# Patient Record
Sex: FEMALE | Race: WHITE | URBAN - METROPOLITAN AREA
[De-identification: names, ages, dates, MRNs, and addresses within clinical notes are randomized per-mention and may not be internally consistent; named-entity substitution may affect disease eponyms.]

---

## 2023-01-03 ENCOUNTER — HOME HEALTH ADMISSION (OUTPATIENT)
Dept: HOME HEALTH SERVICES | Facility: HOME HEALTH | Age: 78
End: 2023-01-03
Payer: MEDICARE

## 2023-01-04 ENCOUNTER — HOME CARE VISIT (OUTPATIENT)
Dept: SCHEDULING | Facility: HOME HEALTH | Age: 78
End: 2023-01-04

## 2023-01-04 VITALS
SYSTOLIC BLOOD PRESSURE: 142 MMHG | OXYGEN SATURATION: 97 % | RESPIRATION RATE: 18 BRPM | DIASTOLIC BLOOD PRESSURE: 68 MMHG | TEMPERATURE: 97.8 F | HEART RATE: 95 BPM

## 2023-01-04 PROCEDURE — 0221000100 HH NO PAY CLAIM PROCEDURE

## 2023-01-04 PROCEDURE — G0162 HHC RN E&M PLAN SVS, 15 MIN: HCPCS

## 2023-01-04 ASSESSMENT — ENCOUNTER SYMPTOMS
DYSPNEA ACTIVITY LEVEL: AFTER AMBULATING MORE THAN 20 FT
PAIN LOCATION - PAIN QUALITY: ACHE

## 2023-01-05 NOTE — HOME HEALTH
Problem: Patient received skilled nursing care today for home care admission assessment SOC. Patient is alert and oriented x4. PMH:  left BKA, depression, anxiety,   Patient has left leg wounds. Caregiver involvement: Pt lives with her dtr Ever Mata and her , dtr can help with ADL's, food prep, transport. Intervention: Medications reconciled with pt and pt manages own meds. Wound care completed today, pt tolerated well. Home health supplies ordered this visit include: saline, xeroform, foam border,skin prep  Patient education provided this visit:   Medication management. Infection control and prevention measures, S/S of infection, left leg dressing care. Home safety, fall precautions   [facility] procedure, physician, and that necessitate calling emergency personnel   Diet/Nutrition   Pt wanted names of prosthetic companys in the area and SN gave her the following and pt stated she will follow up:  [facility] [id number] and [facility] [phone]. Patient/caregiver response: Verbalizes understanding via the teach back method. Progress toward goals: yes, see care plan documentation   Home exercise program: NA  Plan for next home care visit: SN for wound care and medication/disease management   The following discharge planning was discussed with the patient/caregiver: home care agency discharge to be completed when goals met. Patient in agreement.     COVID  This patient has answered YES/NO to the following questions:  1) have you traveled outside the country N  2) have you been exposed to or been in contact with anyone whom traveled outside the country in the past two weeks N  3) do you have a sore throat/fever/dry cough N  4)The patient has been educated on and demonstrates good understanding via the teach-back method for the following: Signs and symptoms of COVID-19 Y

## 2023-01-09 ENCOUNTER — HOME CARE VISIT (OUTPATIENT)
Dept: SCHEDULING | Facility: HOME HEALTH | Age: 78
End: 2023-01-09

## 2023-01-09 VITALS
HEART RATE: 68 BPM | DIASTOLIC BLOOD PRESSURE: 78 MMHG | SYSTOLIC BLOOD PRESSURE: 122 MMHG | RESPIRATION RATE: 16 BRPM | OXYGEN SATURATION: 97 % | TEMPERATURE: 97.4 F

## 2023-01-09 PROCEDURE — G0299 HHS/HOSPICE OF RN EA 15 MIN: HCPCS

## 2023-01-09 ASSESSMENT — ENCOUNTER SYMPTOMS: HEMOPTYSIS: 0

## 2023-01-17 ENCOUNTER — HOME CARE VISIT (OUTPATIENT)
Dept: SCHEDULING | Facility: HOME HEALTH | Age: 78
End: 2023-01-17
Payer: MEDICARE

## 2023-01-17 PROCEDURE — G0299 HHS/HOSPICE OF RN EA 15 MIN: HCPCS

## 2023-01-18 VITALS
DIASTOLIC BLOOD PRESSURE: 78 MMHG | OXYGEN SATURATION: 96 % | TEMPERATURE: 97.4 F | SYSTOLIC BLOOD PRESSURE: 138 MMHG | RESPIRATION RATE: 16 BRPM | HEART RATE: 78 BPM

## 2023-01-18 ASSESSMENT — ENCOUNTER SYMPTOMS: HEMOPTYSIS: 0

## 2023-01-18 NOTE — HOME HEALTH
SN for skilled assessments and wound care management/teaching. Wound care completed as ordered in POC, tolerated well. Wound progressing in healing. No s/s of infection. Patient denies any problems with wound care and aware of s/s of infection to report to /MD/91Laurita. VS all wnl, denies any changes in medication, shob, falls, pain, or any new open wounds at this time. No s/s of distress during SN visit. Aware and approves of next scheduled SN visit.

## 2023-01-20 ENCOUNTER — HOME CARE VISIT (OUTPATIENT)
Dept: SCHEDULING | Facility: HOME HEALTH | Age: 78
End: 2023-01-20
Payer: MEDICARE

## 2023-01-20 PROCEDURE — G0299 HHS/HOSPICE OF RN EA 15 MIN: HCPCS

## 2023-01-21 VITALS
HEART RATE: 77 BPM | OXYGEN SATURATION: 99 % | TEMPERATURE: 97.2 F | SYSTOLIC BLOOD PRESSURE: 136 MMHG | DIASTOLIC BLOOD PRESSURE: 70 MMHG | RESPIRATION RATE: 16 BRPM

## 2023-01-21 ASSESSMENT — ENCOUNTER SYMPTOMS: HEMOPTYSIS: 0

## 2023-01-24 ENCOUNTER — HOME CARE VISIT (OUTPATIENT)
Dept: SCHEDULING | Facility: HOME HEALTH | Age: 78
End: 2023-01-24
Payer: MEDICARE

## 2023-01-24 VITALS
SYSTOLIC BLOOD PRESSURE: 136 MMHG | HEART RATE: 77 BPM | TEMPERATURE: 97.4 F | OXYGEN SATURATION: 97 % | RESPIRATION RATE: 16 BRPM | DIASTOLIC BLOOD PRESSURE: 80 MMHG

## 2023-01-24 PROCEDURE — G0299 HHS/HOSPICE OF RN EA 15 MIN: HCPCS

## 2023-01-24 ASSESSMENT — ENCOUNTER SYMPTOMS
HEMOPTYSIS: 0
DYSPNEA ACTIVITY LEVEL: AFTER AMBULATING 10 - 20 FT

## 2023-01-27 ENCOUNTER — HOME CARE VISIT (OUTPATIENT)
Dept: SCHEDULING | Facility: HOME HEALTH | Age: 78
End: 2023-01-27
Payer: MEDICARE

## 2023-01-27 PROCEDURE — G0299 HHS/HOSPICE OF RN EA 15 MIN: HCPCS

## 2023-01-30 VITALS
TEMPERATURE: 97.4 F | OXYGEN SATURATION: 96 % | HEART RATE: 78 BPM | SYSTOLIC BLOOD PRESSURE: 126 MMHG | RESPIRATION RATE: 16 BRPM | DIASTOLIC BLOOD PRESSURE: 72 MMHG

## 2023-01-30 ASSESSMENT — ENCOUNTER SYMPTOMS: HEMOPTYSIS: 0

## 2023-01-31 ENCOUNTER — HOME CARE VISIT (OUTPATIENT)
Dept: SCHEDULING | Facility: HOME HEALTH | Age: 78
End: 2023-01-31
Payer: MEDICARE

## 2023-01-31 VITALS
TEMPERATURE: 97.4 F | OXYGEN SATURATION: 97 % | HEART RATE: 92 BPM | SYSTOLIC BLOOD PRESSURE: 132 MMHG | RESPIRATION RATE: 16 BRPM | DIASTOLIC BLOOD PRESSURE: 70 MMHG

## 2023-01-31 PROCEDURE — G0299 HHS/HOSPICE OF RN EA 15 MIN: HCPCS

## 2023-01-31 ASSESSMENT — ENCOUNTER SYMPTOMS: HEMOPTYSIS: 0

## 2023-02-03 ENCOUNTER — HOME CARE VISIT (OUTPATIENT)
Dept: SCHEDULING | Facility: HOME HEALTH | Age: 78
End: 2023-02-03
Payer: MEDICARE

## 2023-02-03 PROCEDURE — G0299 HHS/HOSPICE OF RN EA 15 MIN: HCPCS

## 2023-02-04 ENCOUNTER — HOME CARE VISIT (OUTPATIENT)
Dept: SCHEDULING | Facility: HOME HEALTH | Age: 78
End: 2023-02-04
Payer: MEDICARE

## 2023-02-04 PROCEDURE — G0155 HHCP-SVS OF CSW,EA 15 MIN: HCPCS

## 2023-02-04 ASSESSMENT — ENCOUNTER SYMPTOMS: HEMOPTYSIS: 0

## 2023-02-04 NOTE — HOME HEALTH
Patient had a stroke and fell and hurt leg. Patient had left leg ambutation. Patient reports wound on knee after ambutation and tried prosthetic. Prosthetic leg is being remade. Patient reports pain in knee, as well as, neuropothy. Patient lives with daughter and son-in-law. Patient seeking independent housing for seniors and has dog, needs to allow pets. Patient has limited income and is homebound as a result of amputation and being wheelchair bound after stroke and fall. Patient has approximately $1200 a month income, plus Medicare costs, life insurance, prescription insurance, medication expenses, and car insurance for 2005 car that is paid off is only worth approximately $1,000. Patient is  on paper and  is in a nursing home. Patient reports 's son put  in nursing home after  sold their home and property to son for $1.00 (one dollar). Patient reports being deceived by . Patient and  were legally  for years. Patient needs to get a . Patient reports needs Medicaid. Patient reports she is indigent. Patient reports no assets. Patient dependent on others for care physically and financially.  provided teaching regarding independent living for seniors subsidized by [employer], [employer], Special Needs Medicaid, and Long-Term Care Medicaid. Patient has some guilt due to a lack of independence now. Patient feels guilt of getting help of family members.

## 2023-02-04 NOTE — CASE COMMUNICATION
Patient had a stroke and fell and hurt leg. Patient had left leg ambutation. Patient reports wound on knee after ambutation and tried prosthetic. Prosthetic leg is being remade. Patient reports pain in knee, as well as, neuropothy. Patient lives with daughter and son-in-law. Patient seeking independent housing for seniors and has dog, needs to allow pets. Patient has limited income and is homebound as a result of amputation and  being wheelchair bound after stroke and fall. Patient has approximately $1200 a month income, plus Medicare costs, life insurance, prescription insurance, medication expenses, and car insurance for 2005 car that is paid off is only worth approximately $1,000. Patient is  on paper and  is in a nursing home. Patient reports 's son put  in nursing home after  sold their home and property to son for $2 .00 (one dollar). Patient reports being deceived by . Patient and  were legally  for years. Patient needs to get a . Patient reports needs Medicaid. Patient reports she is indigent. Patient reports no assets. Patient dependent on others for care physically and financially.  provided teaching regarding independent living for seniors subsidized by [facility], [employer], [email or web address], and Long-Term Care Medicaid. Patient has some guilt due to a lack of independence now. Patient feels guilt of getting help of family members. Patient suffering from clinically significant Adjustment Disorder.

## 2023-02-06 ASSESSMENT — ENCOUNTER SYMPTOMS: HEMOPTYSIS: 0

## 2023-02-08 ENCOUNTER — HOME CARE VISIT (OUTPATIENT)
Dept: SCHEDULING | Facility: HOME HEALTH | Age: 78
End: 2023-02-08
Payer: MEDICARE

## 2023-02-10 ENCOUNTER — HOME CARE VISIT (OUTPATIENT)
Dept: HOME HEALTH SERVICES | Facility: HOME HEALTH | Age: 78
End: 2023-02-10
Payer: MEDICARE

## 2023-02-15 ENCOUNTER — HOME CARE VISIT (OUTPATIENT)
Dept: SCHEDULING | Facility: HOME HEALTH | Age: 78
End: 2023-02-15
Payer: MEDICARE

## 2023-02-15 PROCEDURE — G0299 HHS/HOSPICE OF RN EA 15 MIN: HCPCS

## 2023-02-16 VITALS
HEART RATE: 78 BPM | DIASTOLIC BLOOD PRESSURE: 82 MMHG | OXYGEN SATURATION: 97 % | TEMPERATURE: 97.3 F | RESPIRATION RATE: 16 BRPM | SYSTOLIC BLOOD PRESSURE: 132 MMHG

## 2023-02-17 NOTE — HOME HEALTH
SN for skilled assessments and wound care management/teaching. Wound care completed as ordered in POC by patient, tolerated well. Wound progressing slowly in healing. No s/s of infection. Patient denies any problems with wound care and aware of s/s of infection to report to SN/MD/911. VS all wnl, denies any changes in medication, shob, falls, pain, or any new open wounds at this time. No s/s of distress during SN visit. Patient aware of when and how to complete wound care. Pt has approx 2 months worth of supplies. SN instructed pt to report to MD if any s/s of infection and/or if wound does not continue to heal by reducing in size or if she has any concerns regarding the look/smell/size of wound, verbalized understanding.